# Patient Record
Sex: FEMALE | Race: WHITE | NOT HISPANIC OR LATINO | URBAN - METROPOLITAN AREA
[De-identification: names, ages, dates, MRNs, and addresses within clinical notes are randomized per-mention and may not be internally consistent; named-entity substitution may affect disease eponyms.]

---

## 2018-07-20 ENCOUNTER — EMERGENCY (EMERGENCY)
Facility: HOSPITAL | Age: 73
LOS: 1 days | Discharge: HOME | End: 2018-07-20
Admitting: EMERGENCY MEDICINE
Payer: MEDICARE

## 2018-07-20 VITALS
SYSTOLIC BLOOD PRESSURE: 167 MMHG | WEIGHT: 110.01 LBS | RESPIRATION RATE: 18 BRPM | DIASTOLIC BLOOD PRESSURE: 90 MMHG | HEART RATE: 81 BPM | TEMPERATURE: 98 F | OXYGEN SATURATION: 97 %

## 2018-07-20 PROCEDURE — 73630 X-RAY EXAM OF FOOT: CPT | Mod: 26,LT

## 2018-07-20 PROCEDURE — 73610 X-RAY EXAM OF ANKLE: CPT | Mod: 26,LT

## 2018-07-20 PROCEDURE — 73630 X-RAY EXAM OF FOOT: CPT

## 2018-07-20 PROCEDURE — 73610 X-RAY EXAM OF ANKLE: CPT

## 2018-07-20 PROCEDURE — 29515 APPLICATION SHORT LEG SPLINT: CPT | Mod: LT

## 2018-07-20 PROCEDURE — 99284 EMERGENCY DEPT VISIT MOD MDM: CPT | Mod: 25

## 2018-07-20 PROCEDURE — 99283 EMERGENCY DEPT VISIT LOW MDM: CPT

## 2018-07-20 RX ORDER — OXYCODONE AND ACETAMINOPHEN 5; 325 MG/1; MG/1
1 TABLET ORAL ONCE
Qty: 0 | Refills: 0 | Status: DISCONTINUED | OUTPATIENT
Start: 2018-07-20 | End: 2018-07-20

## 2018-07-20 RX ORDER — IBUPROFEN 200 MG
600 TABLET ORAL ONCE
Qty: 0 | Refills: 0 | Status: COMPLETED | OUTPATIENT
Start: 2018-07-20 | End: 2018-07-20

## 2018-07-20 RX ADMIN — Medication 600 MILLIGRAM(S): at 22:16

## 2018-07-20 NOTE — ED ADULT NURSE NOTE - OBJECTIVE STATEMENT
73 year old female patient with c/o of L ankle injury.  S/p mechanical fall earlier today, states she fell and was walking on it, and shortly after began to fell pain.  L ankle appears slightly swollen.

## 2018-07-20 NOTE — ED PROVIDER NOTE - OBJECTIVE STATEMENT
74 y/o female present with left foot/ankle pain x1 day. Pt states she tripped while walking and may have had a "twisting motion" and fell to ground. Pt reports no loc, head injury, back/hip pain. Pt reports she was able to get up, stand and walk around with no pain. Pt states hours later, she started to experience pain located on the outside of her ankle. she rested and iced it without alleviation of pain. Pt reports due to the pain, she was no longer able to walk on it. pt reports feeling numbness/tingling for a few brief seconds, but since then has relieved. She denies previous injury and denies skin breaks.

## 2018-07-20 NOTE — ED ADULT NURSE NOTE - CHPI ED SYMPTOMS NEG
no confusion/no numbness/no weakness/no loss of consciousness/no tingling/no deformity/no abrasion/no bleeding/no fever/no vomiting

## 2018-07-20 NOTE — ED PROVIDER NOTE - MEDICAL DECISION MAKING DETAILS
72 y/o female with left ankle sprain. Xray negative. LROM with plantarflexion/extension, however good ankle inversion/eversion. no skin breaks or ecchymosis. Advised RICE treatment. Given ankle stirrups and crutches and advised ortho fu.

## 2018-07-20 NOTE — ED PROVIDER NOTE - LOWER EXTREMITY EXAM, LEFT
GOOD ROM WITH ANKLE EVERSION/INVERSION,LROM WITH DORSIFLEXION AND PLANTARFLEXION, SENATION AND REFLEX INTACT WITH 2+ PULSES, MOD SWELLING/TTP LOCATED ON LAT ANKLE/FOOT/SWELLING/TENDERNESS

## 2018-07-24 DIAGNOSIS — Z88.5 ALLERGY STATUS TO NARCOTIC AGENT: ICD-10-CM

## 2018-07-24 DIAGNOSIS — M25.572 PAIN IN LEFT ANKLE AND JOINTS OF LEFT FOOT: ICD-10-CM
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
## 2018-07-24 DIAGNOSIS — S93.402A SPRAIN OF UNSPECIFIED LIGAMENT OF LEFT ANKLE, INITIAL ENCOUNTER: ICD-10-CM
